# Patient Record
Sex: FEMALE
[De-identification: names, ages, dates, MRNs, and addresses within clinical notes are randomized per-mention and may not be internally consistent; named-entity substitution may affect disease eponyms.]

---

## 2018-06-26 ENCOUNTER — RESULT REVIEW (OUTPATIENT)
Age: 28
End: 2018-06-26

## 2019-10-03 ENCOUNTER — RESULT REVIEW (OUTPATIENT)
Age: 29
End: 2019-10-03

## 2019-11-05 ENCOUNTER — RESULT REVIEW (OUTPATIENT)
Age: 29
End: 2019-11-05

## 2022-08-30 ENCOUNTER — APPOINTMENT (OUTPATIENT)
Dept: OBGYN | Facility: CLINIC | Age: 32
End: 2022-08-30

## 2022-08-30 VITALS
HEIGHT: 60 IN | DIASTOLIC BLOOD PRESSURE: 82 MMHG | WEIGHT: 120.25 LBS | HEART RATE: 103 BPM | SYSTOLIC BLOOD PRESSURE: 130 MMHG | BODY MASS INDEX: 23.61 KG/M2

## 2022-08-30 DIAGNOSIS — Z01.419 ENCOUNTER FOR GYNECOLOGICAL EXAMINATION (GENERAL) (ROUTINE) W/OUT ABNORMAL FINDINGS: ICD-10-CM

## 2022-08-30 DIAGNOSIS — Z82.49 FAMILY HISTORY OF ISCHEMIC HEART DISEASE AND OTHER DISEASES OF THE CIRCULATORY SYSTEM: ICD-10-CM

## 2022-08-30 DIAGNOSIS — F32.A DEPRESSION, UNSPECIFIED: ICD-10-CM

## 2022-08-30 DIAGNOSIS — Z83.3 FAMILY HISTORY OF DIABETES MELLITUS: ICD-10-CM

## 2022-08-30 DIAGNOSIS — F41.9 ANXIETY DISORDER, UNSPECIFIED: ICD-10-CM

## 2022-08-30 DIAGNOSIS — E28.2 POLYCYSTIC OVARIAN SYNDROME: ICD-10-CM

## 2022-08-30 DIAGNOSIS — Z86.19 PERSONAL HISTORY OF OTHER INFECTIOUS AND PARASITIC DISEASES: ICD-10-CM

## 2022-08-30 DIAGNOSIS — Z11.51 ENCOUNTER FOR SCREENING FOR HUMAN PAPILLOMAVIRUS (HPV): ICD-10-CM

## 2022-08-30 PROBLEM — Z00.00 ENCOUNTER FOR PREVENTIVE HEALTH EXAMINATION: Status: ACTIVE | Noted: 2022-08-30

## 2022-08-30 PROCEDURE — 99385 PREV VISIT NEW AGE 18-39: CPT

## 2022-08-30 RX ORDER — SERTRALINE 25 MG/1
TABLET, FILM COATED ORAL
Refills: 0 | Status: ACTIVE | COMMUNITY

## 2022-08-30 NOTE — END OF VISIT
[FreeTextEntry3] : I, Lida Valentin, acted solely as a scribe for Dr. Shelby Stovall, on 08/30/2022. \par \par All medical record entries made by the scribe were at my, Dr. Shelby Stovall., direction and personally dictated by me on 08/30/2022. I have personally reviewed the chart and agree that the record accurately reflects my personal performance of the history, physical exam, assessment and plan.\par

## 2022-08-30 NOTE — PLAN
[FreeTextEntry1] : SALLY DURAN is a 31 year old G0 presenting for new patient annual GYN exam.\par -pap/HPV test collected and sent at today's visit\par -discussed and reviewed importance of monthly BSE\par -advised to take prenatal vit \par -preconception counseling \par -RTO for fasting blood work, pt to RTO 9/7 \par -consider Metformin \par

## 2022-08-30 NOTE — HISTORY OF PRESENT ILLNESS
[FreeTextEntry1] : SALLY DURAN is a 31 year old G0 presenting for new patient annual GYN exam. Was last seen by a GYN Jan 2019. LMP 8/2/22.  Pt reports PCOS diagnosed based on ultrasound and hx irregular menses.  Menses have been regular this past year but pt has hx of cycles as long as q90 days.  she has been having unprotected intercourse since the beginning of the year but not actively trying to conceive.\par \par Hx of +HPV and abnormal pap. Denies any prior pregnancy. Wishes to conceive soon, just started actively trying, started using ovulation kits. Pt has anxiety and depression, taking medication and sees a therapist. \par  \par  [TextBox_4] : New pt, Annual visit and like to discuss family planning with PCOS  [PapSmeardate] : 2019 [LMPDate] : 08/18/22

## 2022-08-31 LAB — HPV HIGH+LOW RISK DNA PNL CVX: NOT DETECTED

## 2022-09-07 ENCOUNTER — APPOINTMENT (OUTPATIENT)
Dept: OBGYN | Facility: CLINIC | Age: 32
End: 2022-09-07

## 2022-09-07 LAB — CYTOLOGY CVX/VAG DOC THIN PREP: NORMAL

## 2022-09-12 ENCOUNTER — TRANSCRIPTION ENCOUNTER (OUTPATIENT)
Age: 32
End: 2022-09-12

## 2022-09-15 LAB
25(OH)D3 SERPL-MCNC: 21.2 NG/ML
ESTIMATED AVERAGE GLUCOSE: 105 MG/DL
ESTRADIOL SERPL-MCNC: 159 PG/ML
FSH SERPL-MCNC: 3.3 IU/L
GLUCOSE BS SERPL-MCNC: 117 MG/DL
HBA1C MFR BLD HPLC: 5.3 %
INSULIN P FAST SERPL-ACNC: 10.2 UU/ML
PROLACTIN SERPL-MCNC: 42.9 NG/ML
TESTOST SERPL-MCNC: 40.7 NG/DL
TSH SERPL-ACNC: 5.14 UIU/ML

## 2023-01-25 ENCOUNTER — APPOINTMENT (OUTPATIENT)
Dept: OBGYN | Facility: CLINIC | Age: 33
End: 2023-01-25

## 2023-01-30 ENCOUNTER — APPOINTMENT (OUTPATIENT)
Dept: OBGYN | Facility: CLINIC | Age: 33
End: 2023-01-30